# Patient Record
Sex: FEMALE | URBAN - METROPOLITAN AREA
[De-identification: names, ages, dates, MRNs, and addresses within clinical notes are randomized per-mention and may not be internally consistent; named-entity substitution may affect disease eponyms.]

---

## 2022-02-14 ENCOUNTER — NURSE TRIAGE (OUTPATIENT)
Dept: OTHER | Facility: OTHER | Age: 57
End: 2022-02-14

## 2022-02-14 DIAGNOSIS — Z20.822 ENCOUNTER FOR SCREENING LABORATORY TESTING FOR COVID-19 VIRUS: Primary | ICD-10-CM

## 2022-02-14 PROCEDURE — U0005 INFEC AGEN DETEC AMPLI PROBE: HCPCS | Performed by: FAMILY MEDICINE

## 2022-02-14 PROCEDURE — U0003 INFECTIOUS AGENT DETECTION BY NUCLEIC ACID (DNA OR RNA); SEVERE ACUTE RESPIRATORY SYNDROME CORONAVIRUS 2 (SARS-COV-2) (CORONAVIRUS DISEASE [COVID-19]), AMPLIFIED PROBE TECHNIQUE, MAKING USE OF HIGH THROUGHPUT TECHNOLOGIES AS DESCRIBED BY CMS-2020-01-R: HCPCS | Performed by: FAMILY MEDICINE

## 2022-02-14 NOTE — TELEPHONE ENCOUNTER
Patient is requesting a covid test and does not have a [facility]'s PCP  Reports having an out of network PCP  Order placed  Patient informed of [address] [place] testing site and was advised of hours of operation, address, to wear a mask, and to stay in the car  Patient verbalized understanding  Patient already has a My Chart account to check for results  Advised patient to begin checking in 2-3 days after testing is completed  Reason for Disposition   [1] COVID-19 infection suspected by caller or triager AND [2] mild symptoms (cough, fever, or others) AND [3] has not gotten tested yet    Answer Assessment - Initial Assessment Questions  Were you within 6 feet or less, for up to 15 minutes or more with a person that has a confirmed COVID-19 test? Thinks she was    What was the date of your exposure? 2/9 or 2/10/22    Are you experiencing any symptoms attributed to the virus?  (Assess for SOB, cough, fever, difficulty breathing) Cough, sore throat, stuffy nose/nasal congestion    HIGH RISK: Do you have any history heart or lung conditions, weakened immune system, diabetes, Asthma, CHF, HIV, COPD, Chemo, renal failure, sickle cell, etc? Asthma with inhaler PRN; denies wheezing or shortness of breath    PREGNANCY: Are you pregnant or did you recently give birth?  N/a    VACCINE: "Have you gotten the COVID-19 vaccine?" If Yes ask: "Which one, how many shots, when did you get it?" Yes, Moderna x 2 shots plus booster    Protocols used: CORONAVIRUS (COVID-19) DIAGNOSED OR SUSPECTED-ADULT-[place]

## 2022-02-14 NOTE — TELEPHONE ENCOUNTER
Regarding: Covid symptomatic cough (2 of 2)  ----- Message from Lidia Peralta sent at 2/14/2022 11:07 AM EST -----  "I would like to be tested for covid  I have a sore throat, cough, and stuffy nose   I have been vaccinated "